# Patient Record
Sex: MALE | Race: OTHER
[De-identification: names, ages, dates, MRNs, and addresses within clinical notes are randomized per-mention and may not be internally consistent; named-entity substitution may affect disease eponyms.]

---

## 2019-02-26 ENCOUNTER — HOSPITAL ENCOUNTER (OUTPATIENT)
Dept: HOSPITAL 47 - LABWHC1 | Age: 84
End: 2019-02-26
Payer: MEDICARE

## 2019-02-26 DIAGNOSIS — E16.2: ICD-10-CM

## 2019-02-26 DIAGNOSIS — N18.3: ICD-10-CM

## 2019-02-26 DIAGNOSIS — N40.0: Primary | ICD-10-CM

## 2019-02-26 DIAGNOSIS — E03.9: ICD-10-CM

## 2019-02-26 DIAGNOSIS — E87.8: ICD-10-CM

## 2019-02-26 DIAGNOSIS — I73.9: ICD-10-CM

## 2019-02-26 DIAGNOSIS — E55.9: ICD-10-CM

## 2019-02-26 DIAGNOSIS — E78.5: ICD-10-CM

## 2019-02-26 LAB
ALBUMIN SERPL-MCNC: 4.1 G/DL (ref 3.8–4.9)
ALBUMIN/GLOB SERPL: 1.52 G/DL (ref 1.6–3.17)
ALP SERPL-CCNC: 91 U/L (ref 41–126)
ALT SERPL-CCNC: 16 U/L (ref 10–49)
ANION GAP SERPL CALC-SCNC: 5.8 MMOL/L (ref 4–12)
AST SERPL-CCNC: 23 U/L (ref 14–35)
BASOPHILS # BLD AUTO: 0.1 K/UL (ref 0–0.2)
BASOPHILS NFR BLD AUTO: 1 %
BUN SERPL-SCNC: 29 MG/DL (ref 9–27)
CALCIUM SPEC-MCNC: 9.7 MG/DL (ref 8.7–10.3)
CHLORIDE SERPL-SCNC: 107 MMOL/L (ref 96–109)
CHOLEST SERPL-MCNC: 207 MG/DL (ref 0–200)
CK SERPL-CCNC: 93 U/L (ref 35–257)
CO2 SERPL-SCNC: 29.2 MMOL/L (ref 21.6–31.8)
EOSINOPHIL # BLD AUTO: 0.2 K/UL (ref 0–0.7)
EOSINOPHIL NFR BLD AUTO: 4 %
ERYTHROCYTE [DISTWIDTH] IN BLOOD BY AUTOMATED COUNT: 4.93 M/UL (ref 4.3–5.9)
ERYTHROCYTE [DISTWIDTH] IN BLOOD: 12.4 % (ref 11.5–15.5)
GLOBULIN SER CALC-MCNC: 2.7 G/DL (ref 1.6–3.3)
GLUCOSE SERPL-MCNC: 98 MG/DL (ref 70–110)
HCT VFR BLD AUTO: 49.5 % (ref 39–53)
HDLC SERPL-MCNC: 66 MG/DL (ref 40–60)
HGB BLD-MCNC: 15.3 GM/DL (ref 13–17.5)
LDLC SERPL CALC-MCNC: 127.6 MG/DL (ref 0–131)
LYMPHOCYTES # SPEC AUTO: 1.7 K/UL (ref 1–4.8)
LYMPHOCYTES NFR SPEC AUTO: 32 %
MAGNESIUM SPEC-SCNC: 1.8 MG/DL (ref 1.5–2.4)
MCH RBC QN AUTO: 31.1 PG (ref 25–35)
MCHC RBC AUTO-ENTMCNC: 31 G/DL (ref 31–37)
MCV RBC AUTO: 100.5 FL (ref 80–100)
MONOCYTES # BLD AUTO: 0.4 K/UL (ref 0–1)
MONOCYTES NFR BLD AUTO: 7 %
NEUTROPHILS # BLD AUTO: 2.7 K/UL (ref 1.3–7.7)
NEUTROPHILS NFR BLD AUTO: 52 %
PH UR: 5 [PH] (ref 5–8)
PLATELET # BLD AUTO: 119 K/UL (ref 150–450)
POTASSIUM SERPL-SCNC: 5.3 MMOL/L (ref 3.5–5.5)
PROT SERPL-MCNC: 6.8 G/DL (ref 6.2–8.2)
PTH-INTACT SERPL-MCNC: 53.7 PG/ML (ref 14–72)
SODIUM SERPL-SCNC: 142 MMOL/L (ref 135–145)
SP GR UR: 1.02 (ref 1–1.03)
T4 FREE SERPL-MCNC: 1.3 NG/DL (ref 0.8–1.8)
TRIGL SERPL-MCNC: 67 MG/DL (ref 0–149)
URATE SERPL-MCNC: 7 MG/DL (ref 3.7–8.7)
UROBILINOGEN UR QL STRIP: <2 MG/DL (ref ?–2)
VIT B12 SERPL-MCNC: 544 PG/ML (ref 200–944)
VLDLC SERPL CALC-MCNC: 13.4 MG/DL (ref 5–40)
WBC # BLD AUTO: 5.2 K/UL (ref 3.8–10.6)

## 2019-02-26 PROCEDURE — 82746 ASSAY OF FOLIC ACID SERUM: CPT

## 2019-02-26 PROCEDURE — 82306 VITAMIN D 25 HYDROXY: CPT

## 2019-02-26 PROCEDURE — 82550 ASSAY OF CK (CPK): CPT

## 2019-02-26 PROCEDURE — 36415 COLL VENOUS BLD VENIPUNCTURE: CPT

## 2019-02-26 PROCEDURE — 85652 RBC SED RATE AUTOMATED: CPT

## 2019-02-26 PROCEDURE — 82607 VITAMIN B-12: CPT

## 2019-02-26 PROCEDURE — 81003 URINALYSIS AUTO W/O SCOPE: CPT

## 2019-02-26 PROCEDURE — 80053 COMPREHEN METABOLIC PANEL: CPT

## 2019-02-26 PROCEDURE — 85025 COMPLETE CBC W/AUTO DIFF WBC: CPT

## 2019-02-26 PROCEDURE — 84153 ASSAY OF PSA TOTAL: CPT

## 2019-02-26 PROCEDURE — 80061 LIPID PANEL: CPT

## 2019-02-26 PROCEDURE — 84443 ASSAY THYROID STIM HORMONE: CPT

## 2019-02-26 PROCEDURE — 84100 ASSAY OF PHOSPHORUS: CPT

## 2019-02-26 PROCEDURE — 83735 ASSAY OF MAGNESIUM: CPT

## 2019-02-26 PROCEDURE — 84439 ASSAY OF FREE THYROXINE: CPT

## 2019-02-26 PROCEDURE — 83970 ASSAY OF PARATHORMONE: CPT

## 2019-02-26 PROCEDURE — 84550 ASSAY OF BLOOD/URIC ACID: CPT

## 2019-07-01 ENCOUNTER — HOSPITAL ENCOUNTER (OUTPATIENT)
Dept: HOSPITAL 47 - LABWHC1 | Age: 84
Discharge: HOME | End: 2019-07-01
Attending: INTERNAL MEDICINE
Payer: MEDICARE

## 2019-07-01 DIAGNOSIS — I10: Primary | ICD-10-CM

## 2019-07-01 LAB
ANION GAP SERPL CALC-SCNC: 7.8 MMOL/L (ref 4–12)
BUN SERPL-SCNC: 25 MG/DL (ref 9–27)
BUN/CREAT SERPL: 16.67 RATIO (ref 12–20)
CALCIUM SPEC-MCNC: 9.8 MG/DL (ref 8.7–10.3)
CHLORIDE SERPL-SCNC: 106 MMOL/L (ref 96–109)
CO2 SERPL-SCNC: 27.2 MMOL/L (ref 21.6–31.8)
GLUCOSE SERPL-MCNC: 97 MG/DL (ref 70–110)
POTASSIUM SERPL-SCNC: 5.4 MMOL/L (ref 3.5–5.5)
SODIUM SERPL-SCNC: 141 MMOL/L (ref 135–145)

## 2019-07-01 PROCEDURE — 80048 BASIC METABOLIC PNL TOTAL CA: CPT

## 2019-07-01 PROCEDURE — 36415 COLL VENOUS BLD VENIPUNCTURE: CPT

## 2019-08-12 ENCOUNTER — HOSPITAL ENCOUNTER (OUTPATIENT)
Dept: HOSPITAL 47 - LABWHC1 | Age: 84
End: 2019-08-12
Attending: INTERNAL MEDICINE
Payer: MEDICARE

## 2019-08-12 DIAGNOSIS — I71.2: Primary | ICD-10-CM

## 2019-08-12 LAB — BUN SERPL-SCNC: 22 MG/DL (ref 9–27)

## 2019-08-12 PROCEDURE — 36415 COLL VENOUS BLD VENIPUNCTURE: CPT

## 2019-08-12 PROCEDURE — 82565 ASSAY OF CREATININE: CPT

## 2019-08-12 PROCEDURE — 84520 ASSAY OF UREA NITROGEN: CPT

## 2019-08-14 ENCOUNTER — HOSPITAL ENCOUNTER (OUTPATIENT)
Dept: HOSPITAL 47 - RADCTMAIN | Age: 84
Discharge: HOME | End: 2019-08-14
Attending: INTERNAL MEDICINE
Payer: MEDICARE

## 2019-08-14 DIAGNOSIS — I71.4: Primary | ICD-10-CM

## 2019-08-14 PROCEDURE — 71275 CT ANGIOGRAPHY CHEST: CPT

## 2019-08-14 NOTE — CT
EXAMINATION TYPE: CT angio chest

 

DATE OF EXAM: 8/14/2019 1:19 PM

 

COMPARISON: 6/5/2018

 

HISTORY: Thoracic aortic aneurysm.

 

CT DLP: 347 mGycm

Automated exposure control for dose reduction was used.

 

CONTRAST: 

CTA scan of the thorax is performed with IV Contrast, patient injected with 80 mL of Isovue 370, pulm
onary embolism protocol. .  

 

FINDINGS:

Ascending thoracic aorta is similar in size measuring up to 4.3 cm. Arch of the aorta measuring up to
 2.9 cm. A few scattered calcified atheromatous plaques are seen in the arch of the aorta and extendi
ng into the left subclavian artery. 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
uring up to 1.7 cm. Tortuosity of the descending thoracic aorta measuring up to 2.8 cm. No evidence o
f dissection.

 

LUNGS: Redemonstration of subpleural reticulation scattered throughout the bilateral lungs without ev
idence of suspicious nodules, masses or infiltrates. There is no pleural effusion or pneumothorax see
n.  The tracheobronchial tree is patent.

 

MEDIASTINUM: There is satisfactory enhancement of the pulmonary artery and its branches, there is no 
CT evidence for pulmonary embolism.

 

No axillary lymphadenopathy. Redemonstration of anterior tracheal lymph node measuring up to 1.8 cm w
ith previous measurement of approximately 1.3 cm. No pericardial effusion.

 

OTHER: Multilevel degenerative changes of the thoracic spine.

 

IMPRESSION: 

SIMILAR SIZE OF THE ASCENDING THORACIC AORTA MEASURING UP TO 4.3 CM. CONTINUED SURVEILLANCE IS RECOMM
ENDED.

 

SLIGHTLY INCREASING SIZE OF ANTERIOR TRACHEAL MEDIASTINAL LYMPH NODE, WHICH MAY BE DUE TO THE DIFFERE
NCES IN CONTRAST AND NONCONTRAST EXAMINATION. ATTENTION ON FOLLOW-UP FILMS IS RECOMMENDED.

## 2019-10-24 ENCOUNTER — HOSPITAL ENCOUNTER (OUTPATIENT)
Dept: HOSPITAL 47 - EC | Age: 84
Setting detail: OBSERVATION
LOS: 2 days | Discharge: HOME | End: 2019-10-26
Attending: INTERNAL MEDICINE | Admitting: INTERNAL MEDICINE
Payer: MEDICARE

## 2019-10-24 DIAGNOSIS — I49.5: ICD-10-CM

## 2019-10-24 DIAGNOSIS — I73.00: ICD-10-CM

## 2019-10-24 DIAGNOSIS — Z88.0: ICD-10-CM

## 2019-10-24 DIAGNOSIS — N18.3: ICD-10-CM

## 2019-10-24 DIAGNOSIS — F41.9: ICD-10-CM

## 2019-10-24 DIAGNOSIS — I48.91: ICD-10-CM

## 2019-10-24 DIAGNOSIS — T44.7X5A: ICD-10-CM

## 2019-10-24 DIAGNOSIS — R00.1: ICD-10-CM

## 2019-10-24 DIAGNOSIS — E55.9: ICD-10-CM

## 2019-10-24 DIAGNOSIS — Z79.899: ICD-10-CM

## 2019-10-24 DIAGNOSIS — Z82.49: ICD-10-CM

## 2019-10-24 DIAGNOSIS — I48.92: ICD-10-CM

## 2019-10-24 DIAGNOSIS — I12.9: Primary | ICD-10-CM

## 2019-10-24 DIAGNOSIS — Z88.1: ICD-10-CM

## 2019-10-24 DIAGNOSIS — E78.5: ICD-10-CM

## 2019-10-24 DIAGNOSIS — I71.2: ICD-10-CM

## 2019-10-24 DIAGNOSIS — Z86.13: ICD-10-CM

## 2019-10-24 DIAGNOSIS — E86.0: ICD-10-CM

## 2019-10-24 DIAGNOSIS — Z88.8: ICD-10-CM

## 2019-10-24 LAB
ALBUMIN SERPL-MCNC: 4.5 G/DL (ref 3.5–5)
ALP SERPL-CCNC: 97 U/L (ref 38–126)
ALT SERPL-CCNC: 21 U/L (ref 21–72)
ANION GAP SERPL CALC-SCNC: 10 MMOL/L
APTT BLD: 28.1 SEC (ref 22–30)
AST SERPL-CCNC: 36 U/L (ref 17–59)
BASOPHILS # BLD AUTO: 0.2 K/UL (ref 0–0.2)
BASOPHILS NFR BLD AUTO: 2 %
BUN SERPL-SCNC: 26 MG/DL (ref 9–20)
CALCIUM SPEC-MCNC: 9.7 MG/DL (ref 8.4–10.2)
CHLORIDE SERPL-SCNC: 104 MMOL/L (ref 98–107)
CO2 SERPL-SCNC: 25 MMOL/L (ref 22–30)
EOSINOPHIL # BLD AUTO: 0.4 K/UL (ref 0–0.7)
EOSINOPHIL NFR BLD AUTO: 6 %
ERYTHROCYTE [DISTWIDTH] IN BLOOD BY AUTOMATED COUNT: 5.05 M/UL (ref 4.3–5.9)
ERYTHROCYTE [DISTWIDTH] IN BLOOD: 12.5 % (ref 11.5–15.5)
GLUCOSE SERPL-MCNC: 88 MG/DL (ref 74–99)
HCT VFR BLD AUTO: 50.4 % (ref 39–53)
HGB BLD-MCNC: 15.9 GM/DL (ref 13–17.5)
INR PPP: 1 (ref ?–1.2)
LYMPHOCYTES # SPEC AUTO: 2.6 K/UL (ref 1–4.8)
LYMPHOCYTES NFR SPEC AUTO: 34 %
MAGNESIUM SPEC-SCNC: 1.7 MG/DL (ref 1.6–2.3)
MCH RBC QN AUTO: 31.4 PG (ref 25–35)
MCHC RBC AUTO-ENTMCNC: 31.4 G/DL (ref 31–37)
MCV RBC AUTO: 99.9 FL (ref 80–100)
MONOCYTES # BLD AUTO: 0.5 K/UL (ref 0–1)
MONOCYTES NFR BLD AUTO: 6 %
NEUTROPHILS # BLD AUTO: 3.7 K/UL (ref 1.3–7.7)
NEUTROPHILS NFR BLD AUTO: 49 %
PH UR: 6 [PH] (ref 5–8)
PLATELET # BLD AUTO: 105 K/UL (ref 150–450)
POTASSIUM SERPL-SCNC: 5.1 MMOL/L (ref 3.5–5.1)
PROT SERPL-MCNC: 8 G/DL (ref 6.3–8.2)
PT BLD: 10.6 SEC (ref 9–12)
SODIUM SERPL-SCNC: 139 MMOL/L (ref 137–145)
SP GR UR: 1.01 (ref 1–1.03)
UROBILINOGEN UR QL STRIP: <2 MG/DL (ref ?–2)
WBC # BLD AUTO: 7.5 K/UL (ref 3.8–10.6)

## 2019-10-24 PROCEDURE — 99283 EMERGENCY DEPT VISIT LOW MDM: CPT

## 2019-10-24 PROCEDURE — 85730 THROMBOPLASTIN TIME PARTIAL: CPT

## 2019-10-24 PROCEDURE — 36415 COLL VENOUS BLD VENIPUNCTURE: CPT

## 2019-10-24 PROCEDURE — 70450 CT HEAD/BRAIN W/O DYE: CPT

## 2019-10-24 PROCEDURE — 85610 PROTHROMBIN TIME: CPT

## 2019-10-24 PROCEDURE — 81003 URINALYSIS AUTO W/O SCOPE: CPT

## 2019-10-24 PROCEDURE — 80053 COMPREHEN METABOLIC PANEL: CPT

## 2019-10-24 PROCEDURE — 99285 EMERGENCY DEPT VISIT HI MDM: CPT

## 2019-10-24 PROCEDURE — 93005 ELECTROCARDIOGRAM TRACING: CPT

## 2019-10-24 PROCEDURE — 85025 COMPLETE CBC W/AUTO DIFF WBC: CPT

## 2019-10-24 PROCEDURE — 84484 ASSAY OF TROPONIN QUANT: CPT

## 2019-10-24 PROCEDURE — 84443 ASSAY THYROID STIM HORMONE: CPT

## 2019-10-24 PROCEDURE — 80048 BASIC METABOLIC PNL TOTAL CA: CPT

## 2019-10-24 PROCEDURE — 83735 ASSAY OF MAGNESIUM: CPT

## 2019-10-24 NOTE — CT
EXAMINATION TYPE: CT brain wo con

 

DATE OF EXAM: 10/24/2019

 

COMPARISON: None

 

HISTORY: Hypertensive. Pt states BP was in the "300s".

 

CT DLP: 1099.4 mGycm

Automated exposure control for dose reduction was used.

 

FINDINGS: 

There is cerebral cortical atrophy. There is no mass effect nor midline shift. There is no sign of in
tracranial hemorrhage. Calvarium is intact.

 

IMPRESSION: 

CEREBRAL ATROPHY. NO ACUTE INTRACRANIAL ABNORMALITY.

## 2019-10-24 NOTE — ED
General Adult HPI





- General


Source: patient, RN notes reviewed, old records reviewed


Mode of arrival: ambulatory


Limitations: no limitations





<Chirag Brooks - Last Filed: 10/24/19 21:48>





<Ana Tabor - Last Filed: 10/29/19 13:43>





- General


Chief complaint: Recheck/Abnormal Lab/Rx


Stated complaint: High BP


Time Seen by Provider: 10/24/19 19:27





- History of Present Illness


Initial comments: 


89-year-old male patient past history of hypertension resents to ED with chief 

complaint of hypertension.  Patient reports that he woke up within a minute.  

Patient reports that he felt like his head was throbbing, took his blood 

pressure.  Patient reports that was very elevated over 200 systolic.  Patient 

points is his systolic is generally under 150.  Patient took a double dose of 

his metoprolol.  Patient is prescribed 12.5, took 25.  Patient is currently 

asymptomatic at this time.





Systemic: Pt denies fatigue, fever/chills, rash. Pt denies weakness, night 

sweats, weight loss. 


Neuro: Pt denies headache, visual disturbances, syncope or pre-syncope.


HEENT: Pt denies ocular discharge or irritation, otalgia, rhinorrhea, 

pharyngitis or notable lymphadenopathy. 


Cardiopulmonary: Pt denies chest pain, SOB, heart palpitations, dyspnea on 

exertion.  


Abdominal/GI: Pt denies abdominal pain, n/v/d. 


: Pt denies dysuria, burning w/ urination, frequency/urgency. Denies new onset

urinary or bowel incontinence.  


MSK: Pt denies myalgia, loss of strength or function in extremities. 


Neuro: Pt denies new onset weakness, paresthesias. 


 (Chirag Brooks)





- Related Data


                                Home Medications











 Medication  Instructions  Recorded  Confirmed


 


Atorvastatin [Lipitor] 10 mg PO DAILY 10/25/19 10/26/19


 


Diazepam [Valium] 5 mg PO DAILY PRN 10/26/19 10/26/19








                                  Previous Rx's











 Medication  Instructions  Recorded


 


hydrALAZINE HCL [Apresoline] 10 mg PO BID #60 tab 10/26/19











                                    Allergies











Allergy/AdvReac Type Severity Reaction Status Date / Time


 


gentamicin Allergy  Rash/Hives Verified 10/26/19 15:50


 


Penicillins Allergy  Rash/Hives Verified 10/26/19 15:50


 


apixaban [From Eliquis] AdvReac  WEAKNESS Verified 10/26/19 15:50


 


epinephrine AdvReac  tachycardia Verified 10/26/19 15:50


 


sertraline [From Zoloft] AdvReac  Hallucinati Verified 10/26/19 15:50





   ons  














Review of Systems


ROS Other: All systems not noted in ROS Statement are negative.





<Chirag Brooks - Last Filed: 10/24/19 21:48>


ROS Other: All systems not noted in ROS Statement are negative.





<Ana Tabor - Last Filed: 10/29/19 13:43>


ROS Statement: 


Those systems with pertinent positive or pertinent negative responses have been 

documented in the HPI.








Past Medical History


Past Medical History: Atrial Fibrillation, Hypertension


Additional Past Medical History / Comment(s): MALARIA WHEN IN [place]


History of Any Multi-Drug Resistant Organisms: None Reported


Past Surgical History: Hernia Repair, Orthopedic Surgery


Additional Past Surgical History / Comment(s): lt hip surgery,ing hernia repair,

left elbow sx


Past Anesthesia/Blood Transfusion Reactions: No Reported Reaction


Additional Past Anesthesia/Blood Transfusion Reaction / Comment(s): no past 

blood transfusion


Past Psychological History: No Psychological Hx Reported


Smoking Status: Never smoker


Past Alcohol Use History: None Reported


Past Drug Use History: None Reported





- Past Family History


  ** Father


Family Medical History: Myocardial Infarction (MI)





  ** Mother


Family Medical History: No Reported History





<Chirag Brooks - Last Filed: 10/24/19 21:48>





General Exam


Limitations: no limitations





<Chirag Brooks - Last Filed: 10/24/19 21:48>





- General Exam Comments


Initial Comments: 








Constitutional: NAD, AOX3, Pt has pleasant affect. 


HEENT: NC/AT, trachea midline, neck supple, no lymphadenopathy. Posterior 

pharynx non erythematous, without exudates. External ears appear normal, without

discharge. Mucous membranes moist. Eyes PERRLA, EOM intact. There is no scleral 

icterus. No pallor noted. 


Cardiopulmonary: RRR, no murmurs, rubs or gallops, no JVD noted. Lungs CTAB in 

anterior and posterior fields. No peripheral edema. 


Abdominal exam: Abdomen soft and non-distended. Abdomen non-tender to palpation 

in all 4 quadrants. Bowel sounds active in LLQ. No hepatosplenomegaly. No 

ecchymosis


Neuro: CN II-XII intact. No nuchal rigidity. No raccon eyes, no rojas sign, no 

hemotympanum. No cervical spinal tenderness. 


MSK: No posterior calf tenderness bilaterally, homans sign negative bilaterally.

Posterior tibialis and radial pulse +2 bilaterally. Sensation intact in upper 

and lower extremities. Full active ROM in upper and lower extremities, 5/5 

stregnth. 


 (Chirag Brooks)





Course





                                   Vital Signs











  10/24/19 10/24/19 10/24/19





  19:08 20:26 21:47


 


Temperature 97 F L  


 


Pulse Rate 52 L 44 L 45 L


 


Respiratory 20 18 18





Rate   


 


Blood Pressure 155/71 175/93 140/61


 


O2 Sat by Pulse 99 100 97





Oximetry   














  10/24/19





  22:32


 


Temperature 


 


Pulse Rate 45 L


 


Respiratory 18





Rate 


 


Blood Pressure 145/72


 


O2 Sat by Pulse 97





Oximetry 














Medical Decision Making





- Lab Data


Result diagrams: 


                                 10/24/19 19:35





                                 10/24/19 19:35





<Chirag Brooks - Last Filed: 10/24/19 21:48>





- Lab Data


Result diagrams: 


                                 10/24/19 19:35





                                 10/26/19 05:39





<Ana Tabor - Last Filed: 10/29/19 13:43>





- Medical Decision Making


89-year-old male patient past history of hypertension resents to ED with chief 

complaint of hypertension.  Patient reports that he woke up within a minute.  

Patient reports that he felt like his head was throbbing, took his blood 

pressure.  Patient reports that was very elevated over 200 systolic.  Patient 

points is his systolic is generally under 150.  Patient took a double dose of 

his metoprolol.  Patient is prescribed 12.5, took 25.  Patient is currently 

asymptomatic at this time.  Patient vital signs displayed mild bradycardia blood

pressure stable.  Physical exam did not display acute pathology.  Neurologic 

exam within normal limits.  Of investigations noncompressive,.  Baseline.  EKG 

displayed bradycardia.  Brain CT displayed cerebral atrophy, no acute 

intracranial abnormality.  Patient is very likely due to beta blocker.  Patient 

will be placed in observation with cardiac monitor.  Case discussed with Dr. Tabor. 


 (Chirag Brooks)


I was available for consultation in the emergency department.  The history and 

physical exam were done by the midlevel provider. I was consulted for this 

patients care. I reviewed the case with the midlevel provider and based on 

their presentation of the patient, I agree with the assessment, medical decision

making and plan of care as documented. I evaluated the patient myself. I 

discussed the case with Dr. Burris who accepted admission of the patient with ca

rdiology consult. 





Chart was dictated using Dragon dictation software.  Attempts were made to corre

ct any dictation errors however some typographical errors may persist. 


 (Ana Tabor)





- Lab Data





                                   Lab Results











  10/24/19 10/24/19 10/24/19 Range/Units





  09:15 19:35 19:35 


 


WBC   7.5   (3.8-10.6)  k/uL


 


RBC   5.05   (4.30-5.90)  m/uL


 


Hgb   15.9   (13.0-17.5)  gm/dL


 


Hct   50.4   (39.0-53.0)  %


 


MCV   99.9   (80.0-100.0)  fL


 


MCH   31.4   (25.0-35.0)  pg


 


MCHC   31.4   (31.0-37.0)  g/dL


 


RDW   12.5   (11.5-15.5)  %


 


Plt Count   105 L   (150-450)  k/uL


 


Neutrophils %   49   %


 


Lymphocytes %   34   %


 


Monocytes %   6   %


 


Eosinophils %   6   %


 


Basophils %   2   %


 


Neutrophils #   3.7   (1.3-7.7)  k/uL


 


Lymphocytes #   2.6   (1.0-4.8)  k/uL


 


Monocytes #   0.5   (0-1.0)  k/uL


 


Eosinophils #   0.4   (0-0.7)  k/uL


 


Basophils #   0.2   (0-0.2)  k/uL


 


PT     (9.0-12.0)  sec


 


INR     (<1.2)  


 


APTT     (22.0-30.0)  sec


 


Sodium    139  (137-145)  mmol/L


 


Potassium    5.1  (3.5-5.1)  mmol/L


 


Chloride    104  ()  mmol/L


 


Carbon Dioxide    25  (22-30)  mmol/L


 


Anion Gap    10  mmol/L


 


BUN    26 H  (9-20)  mg/dL


 


Creatinine    1.56 H  (0.66-1.25)  mg/dL


 


Est GFR (CKD-EPI)AfAm    45  (>60 ml/min/1.73 sqM)  


 


Est GFR (CKD-EPI)NonAf    39  (>60 ml/min/1.73 sqM)  


 


Glucose    88  (74-99)  mg/dL


 


Calcium    9.7  (8.4-10.2)  mg/dL


 


Magnesium    1.7  (1.6-2.3)  mg/dL


 


Total Bilirubin    0.6  (0.2-1.3)  mg/dL


 


AST    36  (17-59)  U/L


 


ALT    21  (21-72)  U/L


 


Alkaline Phosphatase    97  ()  U/L


 


Troponin I     (0.000-0.034)  ng/mL


 


Total Protein    8.0  (6.3-8.2)  g/dL


 


Albumin    4.5  (3.5-5.0)  g/dL


 


TSH  2.030    (0.465-4.680)  mIU/L


 


Urine Color     


 


Urine Appearance     (Clear)  


 


Urine pH     (5.0-8.0)  


 


Ur Specific Gravity     (1.001-1.035)  


 


Urine Protein     (Negative)  


 


Urine Glucose (UA)     (Negative)  


 


Urine Ketones     (Negative)  


 


Urine Blood     (Negative)  


 


Urine Nitrite     (Negative)  


 


Urine Bilirubin     (Negative)  


 


Urine Urobilinogen     (<2.0)  mg/dL


 


Ur Leukocyte Esterase     (Negative)  














  10/24/19 10/24/19 10/24/19 Range/Units





  19:35 19:35 20:25 


 


WBC     (3.8-10.6)  k/uL


 


RBC     (4.30-5.90)  m/uL


 


Hgb     (13.0-17.5)  gm/dL


 


Hct     (39.0-53.0)  %


 


MCV     (80.0-100.0)  fL


 


MCH     (25.0-35.0)  pg


 


MCHC     (31.0-37.0)  g/dL


 


RDW     (11.5-15.5)  %


 


Plt Count     (150-450)  k/uL


 


Neutrophils %     %


 


Lymphocytes %     %


 


Monocytes %     %


 


Eosinophils %     %


 


Basophils %     %


 


Neutrophils #     (1.3-7.7)  k/uL


 


Lymphocytes #     (1.0-4.8)  k/uL


 


Monocytes #     (0-1.0)  k/uL


 


Eosinophils #     (0-0.7)  k/uL


 


Basophils #     (0-0.2)  k/uL


 


PT  10.6    (9.0-12.0)  sec


 


INR  1.0    (<1.2)  


 


APTT  28.1    (22.0-30.0)  sec


 


Sodium     (137-145)  mmol/L


 


Potassium     (3.5-5.1)  mmol/L


 


Chloride     ()  mmol/L


 


Carbon Dioxide     (22-30)  mmol/L


 


Anion Gap     mmol/L


 


BUN     (9-20)  mg/dL


 


Creatinine     (0.66-1.25)  mg/dL


 


Est GFR (CKD-EPI)AfAm     (>60 ml/min/1.73 sqM)  


 


Est GFR (CKD-EPI)NonAf     (>60 ml/min/1.73 sqM)  


 


Glucose     (74-99)  mg/dL


 


Calcium     (8.4-10.2)  mg/dL


 


Magnesium     (1.6-2.3)  mg/dL


 


Total Bilirubin     (0.2-1.3)  mg/dL


 


AST     (17-59)  U/L


 


ALT     (21-72)  U/L


 


Alkaline Phosphatase     ()  U/L


 


Troponin I   <0.012   (0.000-0.034)  ng/mL


 


Total Protein     (6.3-8.2)  g/dL


 


Albumin     (3.5-5.0)  g/dL


 


TSH     (0.465-4.680)  mIU/L


 


Urine Color    Light Yellow  


 


Urine Appearance    Clear  (Clear)  


 


Urine pH    6.0  (5.0-8.0)  


 


Ur Specific Gravity    1.006  (1.001-1.035)  


 


Urine Protein    Negative  (Negative)  


 


Urine Glucose (UA)    Negative  (Negative)  


 


Urine Ketones    Negative  (Negative)  


 


Urine Blood    Negative  (Negative)  


 


Urine Nitrite    Negative  (Negative)  


 


Urine Bilirubin    Negative  (Negative)  


 


Urine Urobilinogen    <2.0  (<2.0)  mg/dL


 


Ur Leukocyte Esterase    Negative  (Negative)  














Disposition


Is patient prescribed a controlled substance at d/c from ED?: No





<Chirag Brooks - Last Filed: 10/24/19 21:48>





<Ana Tabor - Last Filed: 10/29/19 13:43>


Clinical Impression: 


 Bradycardia





Disposition: ADMITTED AS IP TO THIS HOSP


Condition: Fair

## 2019-10-25 VITALS — RESPIRATION RATE: 18 BRPM

## 2019-10-25 VITALS — TEMPERATURE: 97.6 F

## 2019-10-25 RX ADMIN — POTASSIUM CHLORIDE SCH MLS/HR: 14.9 INJECTION, SOLUTION INTRAVENOUS at 11:21

## 2019-10-25 NOTE — P.PN
Progress Note - Text


Progress Note Date: 10/25/19





This is follow-up on 10/25/2019 at 7:08 PM


Patient seen and evaluated still his heart rate in the 40s in the supine 

position.  Patient to receive hydralazine 1 tablet only 10 mg, his blood 

pressure is stable 117 systolic.


I did put a parameter on the hydralazine to be withheld if the blood pressure 

120 systolic.


Patient has chronic kidney disease stage III and we will be maintaining 

perfusion to the renal.


Patient heart rate still in the 40 bradycardia, patient off beta blockers and 

flecainide.  His blood pressure has been controlled.


IV fluid started for hydration with the underlying elevated BUN and creatinine 

with the underlying history of chronic kidney disease stage III and very gentle 

hydration 50 mL an hour dictated ringer.


Patient will stay 1 day more and further evaluation tomorrow for the bradycardia

and the blood pressure and BUN and creatinine will be checked again tomorrow.


The patient continue bradycardia, consideration of pacemaker may be need to be 

discussed.


We obtain BMP in a.m.

## 2019-10-25 NOTE — HP
HISTORY AND PHYSICAL



DATE OF SERVICE:

10/25/2019



He is age 89 years old, white male, , date of birth 1930.  His is FULL

CODE.



NEW DATA:

Height 5 foot 6 inches, weight 61.235 kg., BSA 1.69 m2., BMI 21.8 kg/m2.



Allergy to GENTAMICIN, PENICILLIN, APIXABAN, EPINEPHRINE, and SERTRALINE.



PATIENT CHIEF COMPLAINT:

Patient presented to the emergency room on 10/24/2019 at 1927 pm and seen by the PA and

at that time, he had a present by history of hypertension and he came with the

symptomatic throbbing of his head and at home he had blood pressure was over 200

systolic as well as he had bradycardia.



HISTORY OF PRESENT ILLNESS:

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 Mercedes, the Cardiologist.  He experience yesterday severe rushing of his brain

until the brain was hotness and he checked his blood pressure, found the blood pressure

190 systolic to 200 and he got scared with the throbbing and subsequently he has his

medication metoprolol metoprolol tartrate 12.5 mg once a day and he because of the

blood pressure rush, he took another 25 mg of metoprolol tartrate because of that

elevated hypertension.  Subsequently, his heart rate has dropped to the 40 and late 30s

and subsequently came to the emergency room and seen, evaluated by the PA and followed

by the ER physician, and at that time, they checked him and they checked his medication

and stopped his Tambocor which is Flecainide.  He had taken 50 mg q.12 hours as well as

he has taken metoprolol tartrate 12.5 mg every 48 hours. Subsequently with the finding

of bradycardia in the ER, they stopped his medication and consultation with Dr. Mercedes who did see him this morning.



On reviewing of the past medical history, he had history of chronic kidney disease

stage III and his baseline creatinine 1.43 with the GFR 44.  He has also history of

hypertension, hyperlipidemia, and Raynaud's phenomenon or disease and vitamin D

deficiency.



His allergy as mentioned ELIQUIS, PENICILLIN, EPINEPHRINE, ZOLOFT.



SOCIAL HISTORY:

He is .  He has 3 daughters and he never smoked in his life.



MEDICATIONS:

Vitamin D3 one thousand once a day and he had Lipitor 10 mg once a day and metoprolol

tartrate, he takes 12.5 mg 1/2 tablet of metoprolol tartrate 25 and he takes it every

48 hours.  However, last night he took extra dose because of his blood pressure

elevation.  He has been taking flecainide 50 mg twice a day.



PAST HISTORY:

He had history of ascending thoracic aneurysm, was measured 4.3, and the arch of the

aorta was 2.9 in his last CT angiogram on August 14, 2019.  He has also subpleural

reticulation scattered throughout the lungs bilaterally without evidence of any

suspicious nodule and he has no evidence of pulmonary embolism. He has mentioned for

the impression of the CT of the thoracic aorta that he has ascending thoracic 4.3 cm.

And he mentioned as well in that date was done in August 14, 2019 that he had increase

in size of the anterior tracheal mediastinal lymph node could be difference in the

pictures.



His last August laboratory was indicating that his creatinine was 1.6 and his GFR for

non- 37.6 with the underlying chronic kidney disease stage III.  He has been

also seen in August 19 by Dr. Mercedes with the underlying impression  that patient

has episodic atrial flutter and he had benign essential hypertension, hyperlipidemia.

He has intermittent claudication and history of pericarditis and history of underlying

ascending aortic aneurysm at that time.  Subsequently, after he was seen, he had the CT

angiogram.  Patient also had history of the echocardiogram which was done. The date of

the echocardiogram done by Dr. Mercedes on August 1, 2019 and that indicating

ejection fraction of 65%, and he had mild mitral regurgitation as well as he had the

aortic root was enlarged and the ascending aortic aneurysmal dilatation was present and

otherwise, the trace aortic regurgitation and moderate tricuspid regurgitation and mild

pulmonary regurgitation. and that was read by Dr. Mercedes as well.



REVIEW OF SYSTEMS SHOWED:

1. Neuropsychiatry was stable, but the family is anxious as well as him with the

    events.

2. Cardiovascular.  He did not have any chest pain, but he fell to the rushing,

    flushing of his head with the checking OF the blood pressure was elevated and that

    caused him the anxiety with THE fear of stroke and also found that he took the

    pill, the bradycardia which could be associated with the beta blocker with very

    sensitive to the beta blockers.

3. Pulmonary. No cough.  No expectoration.

4. GI.  No nausea, vomiting, diarrhea, hematemesis.

5. Respiratory.  No cough or expectoration or hemoptysis.

6. Endocrine.  No history of diabetes mellitus or thyroid disease.

7. Genitourinary.  No symptoms.

8. Musculoskeletal.  Felt weak.

Otherwise, the patient is been exercising and walking and no other available

abnormalities in reviewing of the system, reviewing the 14 points.



PHYSICAL EXAMINATION:

The patient is conscious, alert, oriented, and this morning he is sitting up, eating

his breakfast and he was seen already with the Cardiology and the PA, Isabel Salazar and

they stated in their note that if that he had sinus bradycardia and the said

asymptomatic and he is on Lopressor or flecainide and hypertension and dyslipidemia.

They discontinued the flecainide and the Lopressor and the initiated hydralazine 10 mg

twice a day and they plan to check the thyroid and monitor telemetry and if he is

stable, they plan for discharge.



On the current examination, his vital sign is indicating on admission was 97.6 and

orally Fahrenheit.  His heart rate was ranging between 40 to 42, and respiratory rate

was 16, blood pressure 122/59, and the mean blood pressure was 80.  His pulse ox on

room air was 98%, that is on admission; however, at the time seen was stable but still

his temperature 97.6 F oral and his heart rate in the 40s and respiratory rate was 16,

in spite that we have stopped the flecainide and the Lopressor.  His blood pressure

132/81 with a mean 98 and pulse ox was 97%, and subsequently his blood pressure has

started to .  However, patient started on the hydralazine 10 mg twice a day.

Cardiology and Dr. Mercedes actually saw him, the cardiologist today this morning.

The dictation was by his PA.



On the examination, the HEENT the head was normocephalic, atraumatic.  Pupils equal,

reactive.  Conjunctivae were pink.  Sclerae were nonicteric and oropharynx was negative

and able to the eat.  The neck was supple.  No JVD.  No thyromegaly.  No

lymphadenopathy.  Trachea midline.  The chest was clear to auscultation and percussion.

Heart was regular sinus rhythm with bradycardia and the abdomen was soft.  Positive

bowel sounds.  Nontender.

Extremities no edema and positive pulses.  Neurologically stable and able to ambulate

and no lateralizing sign.



ASSESSMENT:

1. Episodic hypertension with acceleration.

2. Underlying anxiety, which patient already had Valium at home and he took a Valium

    because of the anxiety.

3. Bradycardia was very sensitivity to beta blocker as well as flecainide.  He had

    history of arrhythmia in the past, which is not present at this time with the

    latter, and his echocardiogram as mentioned was stable in August.  The underlying

    sick sinus syndrome and hypertension uncontrolled and anxiety disorder added to

    hyperlipidemia and vitamin D insufficiency.



PLAN:

Patient already has been held his medication and we continue holding it and the patient

started on hydralazine and we will continue monitoring the patient today and see if

hydralazine has any effect on his heart rate or the blood pressure and subsequently

further investigation.  If patient continues to have the bradycardia, he may need a

pacemaker and that will be decided by the cardiologist, Dr. Mercedes, as well as we

will be monitoring his blood pressure and apparently the hydralazine dose 10 mg twice a

day.  Probably, patient may need more than 10 mg twice a day.  However, we will be

waiting for the end of the day to see if any improvement and if the patient improved,

will discharge him home.  However, if he has continued to have the problem, probably he

will be staying and further discussion regarding the pacemaker will be considered if is

needed. Patient had in the ER a CT scan by the ER physician, and with the indicating

cerebral atrophy, no acute evidence of intracranial abdomen.





MMODL / IJN: [id number] / Job#: [id number]

## 2019-10-25 NOTE — P.CRDCN
History of Present Illness


History of present illness: 


This is a pleasant 89-year-old  female past medical history significant

for paroxysmal atrial flutter, hypertension and dyslipidemia.  He follows in the

office with Dr. Mercedes.  We have been asked to see him in consultation 

secondary to bradycardia.  He states yesterday he started experiencing a 

headache/.  Sensation in his head.  This does happen to him from time to time an

d then this occurs he checks his blood pressure.  He checked his blood pressure 

yesterday and states it was extremely elevated.  He took half of a Valium to see

if this would improve his blood pressure.  However he became quite anxious and 

concerned as his blood pressure seemed to be going up and his headache was not 

improving.  He took an additional dose of Lopressor and then came to the 

hospital for further evaluation of his blood pressure.  Upon arrival blood 

pressure was 155/93.  Slowly over the course of the next 20 minutes his headache

started to subside.  He denies any symptoms of chest discomfort, shortness of 

breath, dizziness or palpitations.  EKG obtained revealed market sinus bra

dycardia with a heart rate of 40.  He was kept on observation for further 

evaluation.  He states from time to time when he does check his pulse his heart 

rate is in the 40s.  He denies ever having symptoms of dizziness or syncope.  

Laboratory data reviewed, creatinine 1.56 with a GFR of 39, cardiac enzymes 

negative 1, magnesium 1.7 and potassium 5.1.  Current daily cardiac medications

include flecainide 50 mg twice a day, Lopressor 12.5 mg every other day and 

atorvastatin 10 mg daily.





At the time of my exam:


CONSTITUTIONAL: Denies fever. Denies chills.


EYES: Denies blurred vision. Denies vision changes. Denies eye pain.


EARS, NOSE, MOUTH & THROAT: Denies headache. Denies sore throat. Denies ear 

pain.


CARDIOVASCULAR: Denies chest pain. Denies shortness of breath. Denies orthopnea.

Denies PND. Denies palpitations.


RESPIRATORY: Denies cough. 


GASTROINTESTINAL: Denies abdominal pain. Denies diarrhea. Denies constipation. 

Denies nausea. Denies vomiting.


MUSCULOSKELETAL: Denies myalgias.


INTEGUMENTARY: Denies pruitis. Denies rash.


NEUROLOGIC: Denies numbness. Denies tingling. Denies weakness.


PSYCHIATRIC: Denies anxiety. Denies depression.


ENDOCRINE: Denies fatigue. Denies weight change. Denies polydipsia. Denies 

polyurina.


GENITOURINARY: Denies burning, hematuria or urgency with micturation.


HEMATOLOGIC: Denies history of anemia. Denies bleeding. 





Blood pressure 132/81 with a heart rate of 48 afebrile and maintaining oxygen 

saturation on room air


GENERAL: This is a 89-year-old  male in no apparent distress at the 

time of my examination.


HEENT: Head is atraumatic, normocephalic. Pupils are equal, round. Sclerae 

anicteric. Conjunctivae are clear. Mucous membranes of the mouth are moist. Neck

is supple. There is no jugular venous distention. No carotid bruit is heard.


LUNGS: Clear to auscultation no wheezes, rales or rhonchi. No chest wall 

tenderness is noted on palpation or with deep breathing.


HEART: Regular rate and rhythm without murmurs, rubs or gallops. S1 and S2 

heard.


ABDOMEN: Soft, nontender. Bowel sounds are heard. No organomegaly noted.


EXTREMITIES: No evidence of peripheral edema and no calf tenderness noted.


VASCULAR: Radial and dorsalis pedis pulses palpated, no evidence of clubbing.  


NEUROLOGIC: Patient is awake, alert and oriented x3.


 


ASSESSMENT


Sinus bradycardia, asymptomatic. On lopressor and flecanide. 


Hypertension


Dyslipidemia





PLAN


Discontinue flecanide and lopressor. 


Initiate on hydralazine 10 mg BID for blood pressure control. 


Check TSH. 


Continue telemetry monitoring. If heart rate remains stable and he asymptomatic 

he may be discharged later this evening. 


Thank you kindly for this consultation. 





Nurse Practitioner note has been reviewed, I agree with a documented findings 

and plan of care.  Patient was seen and examined.








Past Medical History


Past Medical History: Atrial Fibrillation, Hypertension


Additional Past Medical History / Comment(s): MALARIA WHEN IN [place]


History of Any Multi-Drug Resistant Organisms: None Reported


Past Surgical History: Hernia Repair, Orthopedic Surgery


Additional Past Surgical History / Comment(s): lt hip surgery,ing hernia repair,

left elbow sx


Past Anesthesia/Blood Transfusion Reactions: No Reported Reaction


Additional Past Anesthesia/Blood Transfusion Reaction / Comment(s): no past 

blood transfusion


Past Psychological History: No Psychological Hx Reported


Additional Psychological History / Comment(s): pt is a retired thoracic, 

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


Smoking Status: Never smoker


Past Alcohol Use History: None Reported


Past Drug Use History: None Reported





- Past Family History


  ** Father


Family Medical History: Myocardial Infarction (MI)





  ** Mother


Family Medical History: No Reported History





Medications and Allergies


                                Home Medications











 Medication  Instructions  Recorded  Confirmed  Type


 


Flecainide [Tambocor] 50 mg PO Q12H 10/24/19 10/24/19 History


 


Metoprolol Tartrate [Lopressor] 12.5 mg PO Q48H 10/24/19 10/24/19 History


 


Atorvastatin [Lipitor] 10 mg PO DAILY 10/25/19 10/25/19 History








                                    Allergies











Allergy/AdvReac Type Severity Reaction Status Date / Time


 


gentamicin Allergy  Rash/Hives Verified 10/25/19 00:40


 


Penicillins Allergy  Rash/Hives Verified 10/25/19 00:40


 


apixaban [From Eliquis] AdvReac  WEAKNESS Verified 10/25/19 00:40


 


epinephrine AdvReac  tachycardia Verified 10/25/19 00:40


 


sertraline [From Zoloft] AdvReac  Hallucinati Verified 10/25/19 00:40





   ons  














Physical Exam


Vitals: 


                                   Vital Signs











  Temp Pulse Pulse Resp BP BP Pulse Ox


 


 10/25/19 03:54  97.6 F   42 L  16   122/59  98


 


 10/24/19 23:48  97.4 F L   46 L    174/77  99


 


 10/24/19 22:32   45 L   18  145/72   97


 


 10/24/19 21:47   45 L   18  140/61   97


 


 10/24/19 20:26   44 L   18  175/93   100


 


 10/24/19 19:08  97 F L  52 L   20  155/71   99








                                Intake and Output











 10/24/19 10/25/19 10/25/19





 22:59 06:59 14:59


 


Other:   


 


  Voiding Method  Toilet 


 


  Weight 61.235 kg  














Results





                                 10/24/19 19:35





                                 10/24/19 19:35


                                 Cardiac Enzymes











  10/24/19 10/24/19 Range/Units





  19:35 19:35 


 


AST  36   (17-59)  U/L


 


Troponin I   <0.012  (0.000-0.034)  ng/mL








                                   Coagulation











  10/24/19 Range/Units





  19:35 


 


PT  10.6  (9.0-12.0)  sec


 


APTT  28.1  (22.0-30.0)  sec








                                       CBC











  10/24/19 Range/Units





  19:35 


 


WBC  7.5  (3.8-10.6)  k/uL


 


RBC  5.05  (4.30-5.90)  m/uL


 


Hgb  15.9  (13.0-17.5)  gm/dL


 


Hct  50.4  (39.0-53.0)  %


 


Plt Count  105 L  (150-450)  k/uL








                          Comprehensive Metabolic Panel











  10/24/19 Range/Units





  19:35 


 


Sodium  139  (137-145)  mmol/L


 


Potassium  5.1  (3.5-5.1)  mmol/L


 


Chloride  104  ()  mmol/L


 


Carbon Dioxide  25  (22-30)  mmol/L


 


BUN  26 H  (9-20)  mg/dL


 


Creatinine  1.56 H  (0.66-1.25)  mg/dL


 


Glucose  88  (74-99)  mg/dL


 


Calcium  9.7  (8.4-10.2)  mg/dL


 


AST  36  (17-59)  U/L


 


ALT  21  (21-72)  U/L


 


Alkaline Phosphatase  97  ()  U/L


 


Total Protein  8.0  (6.3-8.2)  g/dL


 


Albumin  4.5  (3.5-5.0)  g/dL








                               Current Medications











Generic Name Dose Route Start Last Admin





  Trade Name Freq  PRN Reason Stop Dose Admin


 


Sodium Chloride  1,000 mls @ 80 mls/hr  10/24/19 22:00  10/25/19 01:32





  Saline 0.9%  IV   Not Given





  .[id number] LINDA  


 


Naloxone HCl  0.2 mg  10/24/19 21:46 





  Narcan  IV  





  Q2M PRN  





  Opioid Reversal  








                                Intake and Output











 10/24/19 10/25/19 10/25/19





 22:59 06:59 14:59


 


Other:   


 


  Voiding Method  Toilet 


 


  Weight 61.235 kg  








                                        





                                 10/24/19 19:35 





                                 10/24/19 19:35

## 2019-10-26 ENCOUNTER — HOSPITAL ENCOUNTER (EMERGENCY)
Dept: HOSPITAL 47 - EC | Age: 84
Discharge: HOME | End: 2019-10-26
Payer: MEDICARE

## 2019-10-26 VITALS — HEART RATE: 60 BPM | RESPIRATION RATE: 12 BRPM | DIASTOLIC BLOOD PRESSURE: 96 MMHG | SYSTOLIC BLOOD PRESSURE: 138 MMHG

## 2019-10-26 VITALS — DIASTOLIC BLOOD PRESSURE: 68 MMHG | SYSTOLIC BLOOD PRESSURE: 115 MMHG | HEART RATE: 51 BPM

## 2019-10-26 VITALS — TEMPERATURE: 97.6 F

## 2019-10-26 DIAGNOSIS — R05: ICD-10-CM

## 2019-10-26 DIAGNOSIS — Z88.1: ICD-10-CM

## 2019-10-26 DIAGNOSIS — Z82.49: ICD-10-CM

## 2019-10-26 DIAGNOSIS — Z88.8: ICD-10-CM

## 2019-10-26 DIAGNOSIS — F41.9: ICD-10-CM

## 2019-10-26 DIAGNOSIS — I48.91: ICD-10-CM

## 2019-10-26 DIAGNOSIS — I10: Primary | ICD-10-CM

## 2019-10-26 DIAGNOSIS — Z79.899: ICD-10-CM

## 2019-10-26 DIAGNOSIS — Z88.0: ICD-10-CM

## 2019-10-26 LAB
ANION GAP SERPL CALC-SCNC: 5 MMOL/L
BUN SERPL-SCNC: 28 MG/DL (ref 9–20)
CALCIUM SPEC-MCNC: 9.2 MG/DL (ref 8.4–10.2)
CHLORIDE SERPL-SCNC: 106 MMOL/L (ref 98–107)
CO2 SERPL-SCNC: 28 MMOL/L (ref 22–30)
GLUCOSE SERPL-MCNC: 91 MG/DL (ref 74–99)
POTASSIUM SERPL-SCNC: 4.5 MMOL/L (ref 3.5–5.1)
SODIUM SERPL-SCNC: 139 MMOL/L (ref 137–145)

## 2019-10-26 PROCEDURE — 99283 EMERGENCY DEPT VISIT LOW MDM: CPT

## 2019-10-26 PROCEDURE — 93005 ELECTROCARDIOGRAM TRACING: CPT

## 2019-10-26 RX ADMIN — POTASSIUM CHLORIDE SCH: 14.9 INJECTION, SOLUTION INTRAVENOUS at 07:29

## 2019-10-26 NOTE — ED
General Adult HPI





- General


Chief complaint: Recheck/Abnormal Lab/Rx


Stated complaint: Hypertensive


Time Seen by Provider: 10/26/19 14:55


Source: patient


Mode of arrival: wheelchair


Limitations: no limitations





- History of Present Illness


Initial comments: 


The patient is an 89-year-old male with past history of atrial fibrillation and 

hypertension who presents to the emergency room with elevated blood pressures at

home.  The patient was recently hospitalized for several days.  He does have 

hard to control blood pressures.  He was also notably bradycardic during his 

hospital stay.  He reports that he saw the hospitalist.  He was taken off of his

flecainide and metoprolol.  They switched him to hydralazine for his blood 

pressure control.  He is also on Valium as he does have a large history of 

anxiety.  He was discharged from the hospital today with the plan to take 

hydralazine every time his blood pressure was over 120.  States that when he got

home he felt a little bit off balance.  Because of this he did take his blood 

pressure.  Reported that his home blood pressure cuff read 300/160.  Because of 

this he did take his hydralazine and a Valium.  He then presented to the 

emergency room for further evaluation.  Patient presents with a blood pressure 

in the 150s systolic.  He reports that he is asymptomatic at this time.  He 

denies any chest pain or shortness of breath.  No visual changes.  Denies any 

headaches.  There are no other alleviating, precipitating or modifying factors








- Related Data


                                Home Medications











 Medication  Instructions  Recorded  Confirmed


 


Atorvastatin [Lipitor] 10 mg PO DAILY 10/25/19 10/26/19


 


Diazepam [Valium] 5 mg PO DAILY PRN 10/26/19 10/26/19








                                  Previous Rx's











 Medication  Instructions  Recorded


 


hydrALAZINE HCL [Apresoline] 10 mg PO BID #60 tab 10/26/19











                                    Allergies











Allergy/AdvReac Type Severity Reaction Status Date / Time


 


gentamicin Allergy  Rash/Hives Verified 10/26/19 15:50


 


Penicillins Allergy  Rash/Hives Verified 10/26/19 15:50


 


apixaban [From Eliquis] AdvReac  WEAKNESS Verified 10/26/19 15:50


 


epinephrine AdvReac  tachycardia Verified 10/26/19 15:50


 


sertraline [From Zoloft] AdvReac  Hallucinati Verified 10/26/19 15:50





   ons  














Review of Systems


ROS Statement: 


Those systems with pertinent positive or pertinent negative responses have been 

documented in the HPI.





ROS Other: All systems not noted in ROS Statement are negative.





Past Medical History


Past Medical History: Atrial Fibrillation, Hypertension


Additional Past Medical History / Comment(s): MALARIA WHEN IN [place]


History of Any Multi-Drug Resistant Organisms: None Reported


Past Surgical History: Hernia Repair, Orthopedic Surgery


Additional Past Surgical History / Comment(s): lt hip surgery,ing hernia repair,

left elbow sx


Past Anesthesia/Blood Transfusion Reactions: No Reported Reaction


Additional Past Anesthesia/Blood Transfusion Reaction / Comment(s): no past 

blood transfusion


Past Psychological History: No Psychological Hx Reported


Smoking Status: Never smoker


Past Alcohol Use History: None Reported


Past Drug Use History: None Reported





- Past Family History


  ** Father


Family Medical History: Myocardial Infarction (MI)





  ** Mother


Family Medical History: No Reported History





General Exam


Limitations: no limitations





Course


                                   Vital Signs











  10/26/19 10/26/19 10/26/19





  14:53 15:21 15:30


 


Temperature 97.6 F  


 


Pulse Rate 65 63 59 L


 


Respiratory 18 11 L 18





Rate   


 


Blood Pressure 151/81  178/95


 


O2 Sat by Pulse 98 99 99





Oximetry   














  10/26/19 10/26/19 10/26/19





  15:40 15:50 16:00


 


Temperature   


 


Pulse Rate 63 63 58 L


 


Respiratory 9 L 18 11 L





Rate   


 


Blood Pressure 158/87 163/90 163/90


 


O2 Sat by Pulse 97 100 100





Oximetry   














  10/26/19 10/26/19 10/26/19





  16:10 16:20 16:30


 


Temperature   


 


Pulse Rate 60 60 


 


Respiratory 22 12 





Rate   


 


Blood Pressure 138/96 138/96 138/96


 


O2 Sat by Pulse 99 99 





Oximetry   














  10/26/19





  16:59


 


Temperature 97.6 F


 


Pulse Rate 60


 


Respiratory 12





Rate 


 


Blood Pressure 138/96


 


O2 Sat by Pulse 99





Oximetry 














EKG Findings





- EKG Comments:


EKG Findings:: EKG demonstrates a normal sinus rhythm with a ventricular rate of

61.  GA interval 96.  QRS 92.  .  No acute ST segment elevations.  There 

is an inverted T-wave in lead 3.





Medical Decision Making





- Medical Decision Making


Upon arrival the patient is placed in room 1.  A thorough history and physical 

exam was performed.  Patient does have repeat blood pressures performed here. 

Blood pressure upon arrival is 151/81.  We to take multiple blood pressure 

measurements.  They are measured between 150s and 160s systolic.  Last blood 

pressure is 138 systolic.  I do completed 12-lead EKG on the patient.  I do have

the patient's wife take his blood pressure with their home cough.  It is notably

20-30 mmHg higher on their cuff even with troubleshooting. I called and 

discussed the case with the patient's primary care physician.  He is is 

satisfied with the patient's blood pressures at this time.  He does not believe 

that he should change his regimen.  He instructed that he should not use that 

cough did take his blood pressures.  He should use a new blood pressure cough 

which is to be delivered to their house tomorrow.  He is a follow-up appointment

this week.  The patient is satisfied with this treatment plan.  If he has any 

new or worsening symptoms she should come back to the emergency room.  Patient 

was then discharged home in stable condition





Disposition


Clinical Impression: 


 Essential hypertension





Disposition: HOME SELF-CARE


Condition: Stable


Instructions (If sedation given, give patient instructions):  Hypertension (ED)


Additional Instructions: 


Please follow-up with the primary care doctor next week for reevaluation of your

high blood pressures.  Return to the emergency room for any new or worsening 

symptoms


Is patient prescribed a controlled substance at d/c from ED?: No


Referrals: 


Sammy Burris MD [Primary Care Provider] - 1-2 days


Time of Disposition: 16:45

## 2019-10-26 NOTE — P.PN
Progress Note - Text


Progress Note Date: 10/26/19





Discharge summary date of service 10/26/2019.


Patient discharged from observation status.


Final diagnoses:


Acute hypertensive episode currently stable blood pressure.


Beta blocker sensitivity with severe bradycardia in association with medications

flecainide.  Has been discontinued.


Hyperlipidemia.  Cardiac arrhythmia stable


Dehydration with the elevated the BUN and creatinine from the baseline.


ER presentation:


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 

up within minutes and self dropping of his head and found blood pressure over 

200 and he stated that 300 mmHg which could be also his machine is wrong.  He 

has been always blood pressure around 150 and he has in the past prescribed to 

him by Dr. Wilkinson cardiologist the flecainide and metoprolol.  Added to his 

symptoms that his bradycardia was severe between 30 and 40 and that was due to 

the metoprolol as he took extra pill to control his blood pressure METOPROLOL 

tartrate 12.5.  Patient his doses at home 12.5 every 48-hour





Hospital course:


Patient admitted to observation status and seen by cardiology Dr. Wilkinson, also 

his medication has been was held and stopped for the beta blocker metoprolol 

tartrate as well as flecainide.


Patient monitored and the started on hydralazine by Dr. Wilkinson cardiologist and 

subsequent monitoring as well as parameter added as blood pressure has been 

gradually improved to less than 120.  His earlier blood pressure was 175/93.


He had no other symptoms no chest pain no nausea no vomiting no diarrhea no 

abdominal pain no history of upper respiratory tract infection or viral infectio

n.  His BUN/creatinine was mildly elevated and subsequently ringer lactate IV 

fluid gentle hydration with with 50 mL an hour has been started and his BUN and 

creatinine returned back to the baseline this morning and patient was stable and

his pulse rate improved to the 50s seen by the cardiology and decided to be 

discharged home to follow-up with the cardiology in 1 week.





Face-to-face exam:


The head was normocephalic and atraumatic pupils equal reactive conjunctiva was 

pink sclera was nonicteric oropharynx was negative


Vital sign was stable with temperature 97.6 F oral and heart rate 51/m res

piratory rate 18 blood pressure 115/68 with a mean blood pressure 83 and the 

pulse ox 97%.


Neck was supple no JVD no thyromegaly no lymphadenopathy trachea midline.


Chest was clear for auscultation percussion.


Heart regular sinus rhythm no dysrhythmia.


Abdomen soft positive bowel sounds no tenderness in 4 quadrants.


Extremities no edema and positive pulses.


Neurologically: No drug no nausea vomiting conscious alert no lateralizing sign 

moving 4 extremities normal ambulation no tremor able to eat and swallow no 

cranial nerve deficit.





Assessment:


Patient stable general condition for discharge and follow-up next week with Dr. Burris as well as follow-up in one to 2 weeks with Dr. Wilkinson cardiologist.


Plan:


Hydralazine prescription given to the patient was parameter to be withheld and 

blood pressure below 120 systolic.  We'll see him in the office next week

## 2019-10-26 NOTE — P.PN
Subjective


Progress Note Date: 10/26/19


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 

pressure and symptoms of pounding in the head.  He was found to be bradycardic. 

He was taken off beta blocker and flecainide.  Patient was initiated on 

hydralazine 10 mg by mouth twice a day.  His blood pressure has been fluctuating

but this morning is about 110/70.  His heart rate is about 50 and above.  

Patient could be discharged home on hydralazine 10 mg by mouth twice a day.  

Follow-up in the office in one week








Objective





- Vital Signs


Vital signs: 


                                   Vital Signs











Temp  97.6 F   10/26/19 07:00


 


Pulse  51 L  10/26/19 07:00


 


Resp  18   10/26/19 07:00


 


BP  115/68   10/26/19 07:00


 


Pulse Ox  97   10/26/19 07:00








                                 Intake & Output











 10/25/19 10/26/19 10/26/19





 18:59 06:59 18:59


 


Intake Total 150  240


 


Balance 150  240


 


Intake:   


 


  Intake, IV Titration 150  





  Amount   


 


    Lactated Ringers 1,000 ml 150  





    @ 50 mls/hr IV .Q20H LINDA   





    Rx#:[id number]   


 


  Oral   240


 


Other:   


 


  Voiding Method Toilet Toilet 


 


  # Voids 2 1 














- Exam





GENERAL EXAM: Patient is alert and oriented and doesn't appear to be in any 

acute distress


HEENT: Normocephalic. Normal reaction of pupils, equal size, normal range of 

extraocular motion. No erythema or exudates in the throat.


NECK: No masses, no nuchal rigidity.


CHEST: No chest wall deformity.


LUNGS: Equal air entry with no crackles or wheeze.


HEART: S1 and S2 normal with no audible mumurs or gallops. Regular rhythm, 

femorals equal on both sides..


ABDOMEN: No hepatosplenomegaly, normal bowel sounds, no guarding or rigidity.


SKIN: No rashes


CENTRAL NERVOUS SYSTEM: No focal deficits.


EXTREMITIES: No cyanosis, clubbing or edema.





- Labs


CBC & Chem 7: 


                                 10/24/19 19:35





                                 10/26/19 05:39


Labs: 


                  Abnormal Lab Results - Last 24 Hours (Table)











  10/26/19 Range/Units





  05:39 


 


BUN  28 H  (9-20)  mg/dL


 


Creatinine  1.48 H  (0.66-1.25)  mg/dL














Assessment and Plan


(1) Essential hypertension


Current Visit: Yes   Status: Acute   Code(s): I10 - ESSENTIAL (PRIMARY) 

HYPERTENSION   SNOMED Code(s): 08339141


   





(2) Bradycardia


Current Visit: Yes   Status: Acute   Code(s): R00.1 - BRADYCARDIA, UNSPECIFIED  

SNOMED Code(s): 78340892


   


Plan: 


Bradycardia has improved.  Blood pressure is better controlled.  Patient could b

e discharged home.  Follow-up in the office in one week